# Patient Record
Sex: MALE | Race: WHITE | URBAN - METROPOLITAN AREA
[De-identification: names, ages, dates, MRNs, and addresses within clinical notes are randomized per-mention and may not be internally consistent; named-entity substitution may affect disease eponyms.]

---

## 2024-08-13 ENCOUNTER — APPOINTMENT (OUTPATIENT)
Dept: CT IMAGING | Age: 29
End: 2024-08-13

## 2024-08-13 ENCOUNTER — HOSPITAL ENCOUNTER (EMERGENCY)
Age: 29
Discharge: HOME | End: 2024-08-13
Attending: EMERGENCY MEDICINE

## 2024-08-13 VITALS
DIASTOLIC BLOOD PRESSURE: 71 MMHG | TEMPERATURE: 98 F | OXYGEN SATURATION: 97 % | RESPIRATION RATE: 18 BRPM | HEART RATE: 107 BPM | HEIGHT: 73 IN | SYSTOLIC BLOOD PRESSURE: 111 MMHG | WEIGHT: 200 LBS | BODY MASS INDEX: 26.51 KG/M2

## 2024-08-13 DIAGNOSIS — R10.13 ABDOMINAL PAIN, EPIGASTRIC: Primary | ICD-10-CM

## 2024-08-13 DIAGNOSIS — F10.920 ACUTE ALCOHOLIC INTOXICATION WITHOUT COMPLICATION (HCC): ICD-10-CM

## 2024-08-13 LAB
ALBUMIN SERPL-MCNC: 4.3 G/DL (ref 3.4–5)
ALBUMIN/GLOB SERPL: 1.3 {RATIO} (ref 1.1–2.2)
ALP SERPL-CCNC: 76 U/L (ref 40–129)
ALT SERPL-CCNC: 28 U/L (ref 10–40)
AMPHETAMINES UR QL SCN>1000 NG/ML: ABNORMAL
ANION GAP SERPL CALCULATED.3IONS-SCNC: 12 MMOL/L (ref 3–16)
APAP SERPL-MCNC: <5 UG/ML (ref 10–30)
AST SERPL-CCNC: 24 U/L (ref 15–37)
BARBITURATES UR QL SCN>200 NG/ML: ABNORMAL
BASOPHILS # BLD: 0.1 K/UL (ref 0–0.2)
BASOPHILS NFR BLD: 1 %
BENZODIAZ UR QL SCN>200 NG/ML: ABNORMAL
BILIRUB SERPL-MCNC: 0.3 MG/DL (ref 0–1)
BILIRUB UR QL STRIP.AUTO: NEGATIVE
BUN SERPL-MCNC: 9 MG/DL (ref 7–20)
CALCIUM SERPL-MCNC: 8.9 MG/DL (ref 8.3–10.6)
CANNABINOIDS UR QL SCN>50 NG/ML: POSITIVE
CHLORIDE SERPL-SCNC: 101 MMOL/L (ref 99–110)
CLARITY UR: CLEAR
CO2 SERPL-SCNC: 24 MMOL/L (ref 21–32)
COCAINE UR QL SCN: POSITIVE
COLOR UR: YELLOW
CREAT SERPL-MCNC: 0.9 MG/DL (ref 0.9–1.3)
DEPRECATED RDW RBC AUTO: 13.7 % (ref 12.4–15.4)
DRUG SCREEN COMMENT UR-IMP: ABNORMAL
EOSINOPHIL # BLD: 0.1 K/UL (ref 0–0.6)
EOSINOPHIL NFR BLD: 1.1 %
ETHANOLAMINE SERPL-MCNC: 128 MG/DL (ref 0–0.08)
FENTANYL SCREEN, URINE: ABNORMAL
FLUAV RNA RESP QL NAA+PROBE: NOT DETECTED
FLUBV RNA RESP QL NAA+PROBE: NOT DETECTED
GFR SERPLBLD CREATININE-BSD FMLA CKD-EPI: >90 ML/MIN/{1.73_M2}
GLUCOSE SERPL-MCNC: 88 MG/DL (ref 70–99)
GLUCOSE UR STRIP.AUTO-MCNC: NEGATIVE MG/DL
HCT VFR BLD AUTO: 47.3 % (ref 40.5–52.5)
HGB BLD-MCNC: 16.2 G/DL (ref 13.5–17.5)
HGB UR QL STRIP.AUTO: NEGATIVE
KETONES UR STRIP.AUTO-MCNC: NEGATIVE MG/DL
LEUKOCYTE ESTERASE UR QL STRIP.AUTO: NEGATIVE
LIPASE SERPL-CCNC: 37 U/L (ref 13–60)
LYMPHOCYTES # BLD: 5 K/UL (ref 1–5.1)
LYMPHOCYTES NFR BLD: 57.2 %
MCH RBC QN AUTO: 30.3 PG (ref 26–34)
MCHC RBC AUTO-ENTMCNC: 34.3 G/DL (ref 31–36)
MCV RBC AUTO: 88.2 FL (ref 80–100)
METHADONE UR QL SCN>300 NG/ML: ABNORMAL
MONOCYTES # BLD: 0.9 K/UL (ref 0–1.3)
MONOCYTES NFR BLD: 10 %
NEUTROPHILS # BLD: 2.7 K/UL (ref 1.7–7.7)
NEUTROPHILS NFR BLD: 30.7 %
NITRITE UR QL STRIP.AUTO: NEGATIVE
OPIATES UR QL SCN>300 NG/ML: ABNORMAL
OXYCODONE UR QL SCN: ABNORMAL
PCP UR QL SCN>25 NG/ML: ABNORMAL
PH UR STRIP.AUTO: 5.5 [PH] (ref 5–8)
PH UR STRIP: 5 [PH]
PLATELET # BLD AUTO: 323 K/UL (ref 135–450)
PMV BLD AUTO: 6.5 FL (ref 5–10.5)
POTASSIUM SERPL-SCNC: 4.5 MMOL/L (ref 3.5–5.1)
PROT SERPL-MCNC: 7.6 G/DL (ref 6.4–8.2)
PROT UR STRIP.AUTO-MCNC: NEGATIVE MG/DL
RBC # BLD AUTO: 5.36 M/UL (ref 4.2–5.9)
SALICYLATES SERPL-MCNC: <0.3 MG/DL (ref 15–30)
SARS-COV-2 RNA RESP QL NAA+PROBE: NOT DETECTED
SODIUM SERPL-SCNC: 137 MMOL/L (ref 136–145)
SP GR UR STRIP.AUTO: 1.02 (ref 1–1.03)
UA COMPLETE W REFLEX CULTURE PNL UR: NORMAL
UA DIPSTICK W REFLEX MICRO PNL UR: NORMAL
URN SPEC COLLECT METH UR: NORMAL
UROBILINOGEN UR STRIP-ACNC: 1 E.U./DL
WBC # BLD AUTO: 8.8 K/UL (ref 4–11)

## 2024-08-13 PROCEDURE — 80143 DRUG ASSAY ACETAMINOPHEN: CPT

## 2024-08-13 PROCEDURE — 70450 CT HEAD/BRAIN W/O DYE: CPT

## 2024-08-13 PROCEDURE — 74177 CT ABD & PELVIS W/CONTRAST: CPT

## 2024-08-13 PROCEDURE — 96360 HYDRATION IV INFUSION INIT: CPT

## 2024-08-13 PROCEDURE — 80179 DRUG ASSAY SALICYLATE: CPT

## 2024-08-13 PROCEDURE — 2580000003 HC RX 258: Performed by: PHYSICIAN ASSISTANT

## 2024-08-13 PROCEDURE — 80307 DRUG TEST PRSMV CHEM ANLYZR: CPT

## 2024-08-13 PROCEDURE — 83690 ASSAY OF LIPASE: CPT

## 2024-08-13 PROCEDURE — 6370000000 HC RX 637 (ALT 250 FOR IP): Performed by: PHYSICIAN ASSISTANT

## 2024-08-13 PROCEDURE — 81003 URINALYSIS AUTO W/O SCOPE: CPT

## 2024-08-13 PROCEDURE — 99285 EMERGENCY DEPT VISIT HI MDM: CPT

## 2024-08-13 PROCEDURE — 96361 HYDRATE IV INFUSION ADD-ON: CPT

## 2024-08-13 PROCEDURE — 6360000004 HC RX CONTRAST MEDICATION: Performed by: PHYSICIAN ASSISTANT

## 2024-08-13 PROCEDURE — 80053 COMPREHEN METABOLIC PANEL: CPT

## 2024-08-13 PROCEDURE — 82077 ASSAY SPEC XCP UR&BREATH IA: CPT

## 2024-08-13 PROCEDURE — 93005 ELECTROCARDIOGRAM TRACING: CPT | Performed by: EMERGENCY MEDICINE

## 2024-08-13 PROCEDURE — 85025 COMPLETE CBC W/AUTO DIFF WBC: CPT

## 2024-08-13 PROCEDURE — 87636 SARSCOV2 & INF A&B AMP PRB: CPT

## 2024-08-13 RX ORDER — 0.9 % SODIUM CHLORIDE 0.9 %
1000 INTRAVENOUS SOLUTION INTRAVENOUS ONCE
Status: COMPLETED | OUTPATIENT
Start: 2024-08-13 | End: 2024-08-13

## 2024-08-13 RX ORDER — FAMOTIDINE 20 MG/1
20 TABLET, FILM COATED ORAL 2 TIMES DAILY
Qty: 30 TABLET | Refills: 0 | Status: SHIPPED | OUTPATIENT
Start: 2024-08-13

## 2024-08-13 RX ADMIN — IOPAMIDOL 75 ML: 755 INJECTION, SOLUTION INTRAVENOUS at 22:05

## 2024-08-13 RX ADMIN — SODIUM CHLORIDE 1000 ML: 9 INJECTION, SOLUTION INTRAVENOUS at 21:49

## 2024-08-13 RX ADMIN — LIDOCAINE HYDROCHLORIDE: 20 SOLUTION ORAL at 21:54

## 2024-08-13 ASSESSMENT — ENCOUNTER SYMPTOMS
COUGH: 0
DIARRHEA: 1
RHINORRHEA: 0
SHORTNESS OF BREATH: 0
NAUSEA: 0
VOMITING: 0
ABDOMINAL PAIN: 1

## 2024-08-13 ASSESSMENT — PAIN DESCRIPTION - LOCATION: LOCATION: CHEST;ABDOMEN

## 2024-08-13 ASSESSMENT — LIFESTYLE VARIABLES
HOW OFTEN DO YOU HAVE A DRINK CONTAINING ALCOHOL: MONTHLY OR LESS
HOW MANY STANDARD DRINKS CONTAINING ALCOHOL DO YOU HAVE ON A TYPICAL DAY: 1 OR 2

## 2024-08-13 ASSESSMENT — PAIN SCALES - GENERAL: PAINLEVEL_OUTOF10: 3

## 2024-08-13 ASSESSMENT — PAIN - FUNCTIONAL ASSESSMENT: PAIN_FUNCTIONAL_ASSESSMENT: 0-10

## 2024-08-14 LAB
EKG ATRIAL RATE: 107 BPM
EKG DIAGNOSIS: NORMAL
EKG P AXIS: 58 DEGREES
EKG P-R INTERVAL: 140 MS
EKG Q-T INTERVAL: 322 MS
EKG QRS DURATION: 86 MS
EKG QTC CALCULATION (BAZETT): 429 MS
EKG R AXIS: 47 DEGREES
EKG T AXIS: 15 DEGREES
EKG VENTRICULAR RATE: 107 BPM

## 2024-08-14 PROCEDURE — 93010 ELECTROCARDIOGRAM REPORT: CPT | Performed by: INTERNAL MEDICINE

## 2024-08-14 NOTE — ED NOTES
Pt discharged back home, reviewed discharged instructions with pt follow up care , pain control and return precautions discussed , pt verbalized understanding. Pt ambulated out of the department with steady gait .

## 2024-08-14 NOTE — ED PROVIDER NOTES
[facility] EMERGENCY DEPARTMENT  EMERGENCY DEPARTMENT ENCOUNTER        Pt Name: Ronald Obrien  MRN: [id number]  Birthdate 1995  Date of evaluation: 8/13/2024  Provider: Hilaria Syed PA-C  PCP: [facility]  Note Started: 9:35 PM EDT 8/13/24       I have seen and evaluated this patient with my supervising physician Humble Raza MD.      CHIEF COMPLAINT       Chief Complaint   Patient presents with    Abdominal Pain     Pt states that a few days ago he started having some stomach and chest pain. Pt states he has also had diarrhea and a cough. Pt states he got brake  in his eyes in Friday and doesn't know if the two are related.        HISTORY OF PRESENT ILLNESS: 1 or more Elements     History From: Patient, taoancé at bedside  Limitations to history : None    Ronald Obrien is a 29 y.o. male who presents to the emergency department today for evaluation for concerns for upper abdominal pain as well as diarrhea.  Patient reports that he has been experiencing pain to his epigastric abdomen, with radiation towards his chest.  Patient reports that this has been a constant occurrence for over the past several weeks.  Patient reports that he has had diarrhea, and has had multiple episodes of looser stool.  He denies any blood in the stool left eye appearance of the stool.  He has no nausea or vomiting.  He has not had any diarrhea today.  He has not had any fevers.  He states that he has had a slight cough for the past couple of days but is attributing this to seasonal allergies.  He has no sputum production or hemoptysis.  He has no shortness of breath.  He denies any recent travels, immobilizations or surgeries.  He is no history of DVT or PE.  No lower leg pain or swelling.  No family history any cardiac events.  The fiancé is at bedside, and reports that when she came home today, she felt that the patient was slurring his words, she feels that they are back to normal now.  Patient

## 2024-08-14 NOTE — ED PROVIDER NOTES
I have personally performed a face to face diagnostic evaluation on this patient. I have fully participated in the care of this patient I personally saw the patient and performed a substantive portion of the visit including all aspects of the medical decision making.  I have reviewed and agree with all pertinent clinical information including history, physical exam, diagnostic tests, and the plan.      HPI: Ronadl Obrien presented with generalized stomach abdominal pain that is been going on for the last few days also has a mild diarrhea and a cough.  Multiple episodes of loose brown stool.  But has not had any diarrhea today.  Cough no sputum production or hemoptysis.  No history of DVT or PE.  No recent travels.  Patient denies any alcohol use but does endorse some occasional drug use recreationally.  Girlfriend was concerned that he was slurring his speech earlier today.  Chief Complaint   Patient presents with    Abdominal Pain     Pt states that a few days ago he started having some stomach and chest pain. Pt states he has also had diarrhea and a cough. Pt states he got brake  in his eyes in Friday and doesn't know if the two are related.       Review of Systems: See BENEDICTO note  Vital Signs: /76   Pulse (!) 107   Temp 98 °F (36.7 °C) (Oral)   Resp 18   Ht 1.854 m (6' 1\")   Wt 90.7 kg (200 lb)   SpO2 98%   BMI 26.39 kg/m²     Alert 29 y.o. male who does not appear toxic or acutely ill  HENT: Atraumatic, oral mucosa moist  Neck: Grossly normal ROM  Chest/Lungs: respiratory effort normal   Abdomen: Soft nontender  Musculoskeletal: Grossly normal ROM  Skin: No palor or diaphoresis    Medical Decision Making and Plan:  Pertinent Labs & Imaging studies reviewed. (See BENEDICTO chart for details)  I agree with BENEDICTO assessment and plan.  29-year-old male who presents for multiple symptoms including stomach pain chest pain diarrhea and cough.  Patient was found to be positive for ethanol despite reporting